# Patient Record
Sex: FEMALE | URBAN - METROPOLITAN AREA
[De-identification: names, ages, dates, MRNs, and addresses within clinical notes are randomized per-mention and may not be internally consistent; named-entity substitution may affect disease eponyms.]

---

## 2018-05-04 ENCOUNTER — APPOINTMENT (OUTPATIENT)
Age: 68
Setting detail: DERMATOLOGY
End: 2018-05-04

## 2018-05-08 ENCOUNTER — APPOINTMENT (OUTPATIENT)
Age: 68
Setting detail: DERMATOLOGY
End: 2018-05-10

## 2018-05-08 DIAGNOSIS — H01.13 ECZEMATOUS DERMATITIS OF EYELID: ICD-10-CM

## 2018-05-08 PROBLEM — H01.139 ECZEMATOUS DERMATITIS OF UNSPECIFIED EYE, UNSPECIFIED EYELID: Status: ACTIVE | Noted: 2018-05-08

## 2018-05-08 PROCEDURE — OTHER TREATMENT REGIMEN: OTHER

## 2018-05-08 PROCEDURE — 99242 OFF/OP CONSLTJ NEW/EST SF 20: CPT

## 2018-05-08 PROCEDURE — OTHER COUNSELING: OTHER

## 2018-05-08 PROCEDURE — OTHER PRESCRIPTION: OTHER

## 2018-05-08 RX ORDER — DESONIDE 0.5 MG/G
GEL TOPICAL
Qty: 1 | Refills: 0 | Status: ERX | COMMUNITY
Start: 2018-05-08

## 2018-05-08 ASSESSMENT — LOCATION SIMPLE DESCRIPTION DERM
LOCATION SIMPLE: LEFT EYEBROW
LOCATION SIMPLE: RIGHT EYEBROW

## 2018-05-08 ASSESSMENT — LOCATION DETAILED DESCRIPTION DERM
LOCATION DETAILED: LEFT CENTRAL EYEBROW
LOCATION DETAILED: RIGHT CENTRAL EYEBROW

## 2018-05-08 ASSESSMENT — SEVERITY ASSESSMENT: SEVERITY: MILD

## 2018-05-08 ASSESSMENT — LOCATION ZONE DERM: LOCATION ZONE: FACE

## 2018-05-08 NOTE — HPI: RASH
Is This A New Presentation, Or A Follow-Up?: Rash
Additional History: Here at request of Salima Negro PA-C for rash on eyelids. Pt reports rash has been present for several months. States “it gets really dry and flaky and then will get big blisters on them. Last Thursday it got so bad that my eyes were almost swollen shut. As soon as I think it’s going to get better, it comes back”. Pt not currently using any topicals on eyelids but has used Ocusoft lid scrub that has helped some. Pt has also tried to discontinue several products of her eye makeup but has seen no improvement with doing so.